# Patient Record
Sex: FEMALE | Race: WHITE | NOT HISPANIC OR LATINO | Employment: FULL TIME | URBAN - NONMETROPOLITAN AREA
[De-identification: names, ages, dates, MRNs, and addresses within clinical notes are randomized per-mention and may not be internally consistent; named-entity substitution may affect disease eponyms.]

---

## 2017-01-13 VITALS
WEIGHT: 168 LBS | BODY MASS INDEX: 28.68 KG/M2 | HEIGHT: 64 IN | SYSTOLIC BLOOD PRESSURE: 116 MMHG | DIASTOLIC BLOOD PRESSURE: 70 MMHG

## 2017-01-13 RX ORDER — LISINOPRIL 10 MG/1
10 TABLET ORAL DAILY
COMMUNITY

## 2017-01-18 ENCOUNTER — OFFICE VISIT (OUTPATIENT)
Dept: OBSTETRICS AND GYNECOLOGY | Facility: CLINIC | Age: 37
End: 2017-01-18

## 2017-01-18 VITALS
WEIGHT: 168 LBS | BODY MASS INDEX: 28.68 KG/M2 | DIASTOLIC BLOOD PRESSURE: 62 MMHG | HEIGHT: 64 IN | SYSTOLIC BLOOD PRESSURE: 126 MMHG

## 2017-01-18 DIAGNOSIS — Z12.4 SCREENING FOR MALIGNANT NEOPLASM OF CERVIX: ICD-10-CM

## 2017-01-18 DIAGNOSIS — Z01.419 ENCOUNTER FOR GYNECOLOGICAL EXAMINATION WITHOUT ABNORMAL FINDING: Primary | ICD-10-CM

## 2017-01-18 PROCEDURE — 99395 PREV VISIT EST AGE 18-39: CPT | Performed by: PHYSICIAN ASSISTANT

## 2017-01-18 NOTE — MR AVS SNAPSHOT
Paty Aaron   2017 2:20 PM   Office Visit    Dept Phone:  [phone]   Encounter #:  [id number]    Provider:  Lorna Longoria PA-C   Department:  [facility]                Your Full Care Plan              Your Updated Medication List          This list is accurate as of: 17  3:00 PM.  Always use your most recent med list.                lisinopril 10 MG tablet   Commonly known as:  PRINIVIL,ZESTRIL       sertraline 50 MG tablet   Commonly known as:  ZOLOFT               We Performed the Following     Liquid-based Pap Smear, Screening       You Were Diagnosed With        Codes Comments    Encounter for gynecological examination without abnormal finding    -  Primary ICD-10-CM: Z01.419  ICD-9-CM: V72.31     Screening for malignant neoplasm of cervix     ICD-10-CM: Z12.4  ICD-9-CM: V76.2       Instructions    SBE monthly  Regular excercise     Patient Instructions History      Upcoming Appointments     Visit Type Date Time Department    ANNUAL 2017  2:20 PM [facility]      [employer] Signup     [employer] allows you to send messages to your doctor, view your test results, renew your prescriptions, schedule appointments, and more. To sign up, go to [employer] and click on the Sign Up Now link in the New User? box. Enter your [employer] Activation Code exactly as it appears below along with the last four digits of your Social Security Number and your Date of Birth () to complete the sign-up process. If you do not sign up before the expiration date, you must request a new code.    [employer] Activation Code: [email or web address]  Expires: 2017  3:00 PM    If you have questions, you can email [email or web address] or call [phone] to talk to our [employer] staff. Remember, [employer] is NOT to be used for urgent needs. For medical emergencies, dial 911.               Other Info from Your Visit         "       Allergies     Penicillins        Reason for Visit     Gynecologic Exam           Vital Signs     Blood Pressure Height Weight Breastfeeding? Body Mass Index Smoking Status    126/62 64\" (162.6 cm) 168 lb (76.2 kg) No 28.84 kg/m2 Current Every Day Smoker      Problems and Diagnoses Noted     Encounter for routine gynecological examination    -  Primary    Screening for cervical cancer            "

## 2017-01-18 NOTE — PROGRESS NOTES
"Subjective   Chief Complaint   Patient presents with   • Gynecologic Exam     Paty Aaron is a 36 y.o. year old  presenting to be seen for her annual exam.   Patient has no complaints today  Menses pretty regular q 28-35 days with 5 day flow  Condoms for birth control    History reviewed. No pertinent past medical history.     Current Outpatient Prescriptions:   •  lisinopril (PRINIVIL,ZESTRIL) 10 MG tablet, Take 10 mg by mouth Daily., Disp: , Rfl:   •  sertraline (ZOLOFT) 50 MG tablet, Take 50 mg by mouth Daily., Disp: , Rfl:    Allergies   Allergen Reactions   • Penicillins       Past Surgical History   Procedure Laterality Date   • No past surgeries     • Vaginal delivery  ,       Social History     Social History   • Marital status:      Spouse name: N/A   • Number of children: N/A   • Years of education: N/A     Occupational History   • Not on file.     Social History Main Topics   • Smoking status: Current Every Day Smoker   • Smokeless tobacco: Never Used   • Alcohol use No   • Drug use: No   • Sexual activity: Yes     Birth control/ protection: Condom     Other Topics Concern   • Not on file     Social History Narrative      Family History   Problem Relation Age of Onset   • Hypertension Father        The following portions of the patient's history were reviewed and updated as appropriate:problem list, current medications, allergies, past family history, past medical history, past social history and past surgical history.    Review of Systems   Constitutional: Negative.    Gastrointestinal: Negative.    Genitourinary: Negative.            Objective   Visit Vitals   • /62   • Ht 64\" (162.6 cm)   • Wt 168 lb (76.2 kg)   • Breastfeeding No   • BMI 28.84 kg/m2       Physical Exam   Constitutional: Vital signs are normal. She appears well-developed and well-nourished.   Neck: No thyroid mass and no thyromegaly present.   Cardiovascular: Normal rate, regular rhythm and normal heart " sounds.    Pulmonary/Chest: Effort normal and breath sounds normal. Right breast exhibits no inverted nipple, no mass, no nipple discharge, no skin change and no tenderness. Left breast exhibits no inverted nipple, no mass, no nipple discharge, no skin change and no tenderness. Breasts are symmetrical.   Abdominal: Soft. Normal appearance. She exhibits no distension. There is no hepatosplenomegaly. There is no tenderness.   Genitourinary: Vagina normal and uterus normal. There is lesion on the right labia. There is no rash or tenderness on the right labia. There is lesion on the left labia. There is no rash or tenderness on the left labia. Cervix exhibits no motion tenderness, no discharge and no friability. Right adnexum displays no mass and no tenderness. Left adnexum displays no mass and no tenderness.   Genitourinary Comments: Sebaceous cysts bilateral labia minora   Skin: Skin is warm, dry and intact.   Psychiatric: She has a normal mood and affect. Her behavior is normal.            Assessment /Plan      Paty was seen today for gynecologic exam.    Diagnoses and all orders for this visit:    Encounter for gynecological examination without abnormal finding    Screening for malignant neoplasm of cervix  -     Liquid-based Pap Smear, Screening               This note was electronically signed.    Lorna Longoria PA-C   January 18, 2017

## 2018-09-12 ENCOUNTER — OFFICE VISIT (OUTPATIENT)
Dept: OBSTETRICS AND GYNECOLOGY | Facility: CLINIC | Age: 38
End: 2018-09-12

## 2018-09-12 VITALS
BODY MASS INDEX: 28.27 KG/M2 | WEIGHT: 165.6 LBS | SYSTOLIC BLOOD PRESSURE: 124 MMHG | DIASTOLIC BLOOD PRESSURE: 80 MMHG | HEIGHT: 64 IN

## 2018-09-12 DIAGNOSIS — Z12.4 SCREENING FOR MALIGNANT NEOPLASM OF CERVIX: ICD-10-CM

## 2018-09-12 DIAGNOSIS — Z12.39 SCREENING FOR BREAST CANCER: ICD-10-CM

## 2018-09-12 DIAGNOSIS — Z01.419 ENCOUNTER FOR GYNECOLOGICAL EXAMINATION WITHOUT ABNORMAL FINDING: Primary | ICD-10-CM

## 2018-09-12 PROCEDURE — 99396 PREV VISIT EST AGE 40-64: CPT | Performed by: PHYSICIAN ASSISTANT

## 2018-09-12 RX ORDER — VALACYCLOVIR HYDROCHLORIDE 1 G/1
TABLET, FILM COATED ORAL
Refills: 2 | COMMUNITY
Start: 2018-06-29

## 2018-09-12 RX ORDER — ERGOCALCIFEROL 1.25 MG/1
CAPSULE ORAL
Refills: 5 | COMMUNITY
Start: 2018-08-30

## 2018-09-12 RX ORDER — CYANOCOBALAMIN 1000 UG/ML
INJECTION, SOLUTION INTRAMUSCULAR; SUBCUTANEOUS
Refills: 5 | COMMUNITY
Start: 2018-08-30 | End: 2022-10-07

## 2018-09-12 NOTE — PROGRESS NOTES
Subjective   Chief Complaint   Patient presents with   • Gynecologic Exam     Last pap done 17-WNL; MMG is due; no complaints       Paty Aaron is a 38 y.o. year old  presenting to be seen for her annual gynecological exam.   She has no complaints or concerns  . Using condoms for birth control  Cycles regular q month  Patient's last menstrual period was 2018 (exact date).      History reviewed. No pertinent past medical history.     Current Outpatient Prescriptions:   •  cyanocobalamin 1000 MCG/ML injection, INJECT 1 ML UNDER THE SKIN ONCE A MONTH FOR VITAMIN B12, Disp: , Rfl: 5  •  lisinopril (PRINIVIL,ZESTRIL) 10 MG tablet, Take 10 mg by mouth Daily., Disp: , Rfl:   •  sertraline (ZOLOFT) 50 MG tablet, Take 50 mg by mouth Daily., Disp: , Rfl:   •  valACYclovir (VALTREX) 1000 MG tablet, TAKE TWO TABLETS BY MOUTH EVERY 12 HOURS FOR 1 DAY FOR FEVER BLISTER, Disp: , Rfl: 2  •  vitamin D (ERGOCALCIFEROL) 75274 units capsule capsule, TAKE 1 CAPSULE BY MOUTH ONCE A WEEK FOR VITAMIN D DEFICIENCY, Disp: , Rfl: 5   Allergies   Allergen Reactions   • Penicillins Rash      Past Surgical History:   Procedure Laterality Date   • NO PAST SURGERIES     • VAGINAL DELIVERY  ,       Social History     Social History   • Marital status:      Spouse name: N/A   • Number of children: N/A   • Years of education: N/A     Occupational History   • Not on file.     Social History Main Topics   • Smoking status: Current Every Day Smoker     Packs/day: 1.00     Types: Cigarettes   • Smokeless tobacco: Never Used   • Alcohol use No   • Drug use: No   • Sexual activity: Yes     Partners: Male     Birth control/ protection: Condom     Other Topics Concern   • Not on file     Social History Narrative   • No narrative on file      Family History   Problem Relation Age of Onset   • Hypertension Father        Review of Systems   Constitutional: Negative.    Gastrointestinal: Negative.    Genitourinary:  "Negative.            Objective   /80   Ht 162.6 cm (64\")   Wt 75.1 kg (165 lb 9.6 oz)   LMP 09/05/2018 (Exact Date)   Breastfeeding? No   BMI 28.43 kg/m²     Physical Exam   Constitutional: She appears well-developed and well-nourished. She is cooperative.   Pulmonary/Chest: Right breast exhibits no inverted nipple, no mass, no nipple discharge, no skin change and no tenderness. Left breast exhibits no inverted nipple, no mass, no nipple discharge, no skin change and no tenderness.   Abdominal: Soft. Normal appearance. There is no tenderness.   Genitourinary: Vagina normal and uterus normal. There is no tenderness or lesion on the right labia. There is no tenderness or lesion on the left labia. Cervix exhibits no motion tenderness and no discharge. Right adnexum displays no mass and no tenderness. Left adnexum displays no mass and no tenderness.   Neurological: She is alert.   Skin: Skin is warm and dry.   Psychiatric: She has a normal mood and affect. Her behavior is normal.            Assessment and Plan  Paty was seen today for gynecologic exam.    Diagnoses and all orders for this visit:    Encounter for gynecological examination without abnormal finding    Screening for malignant neoplasm of cervix  -     Liquid-based Pap Smear, Screening; Future    Screening for breast cancer  -     Mammo Screening Digital Tomosynthesis Bilateral With CAD; Future      Patient Instructions   Recommend self breast exam monthly  Screening mammogram now. Order placed             This note was electronically signed.    Lorna Longoria PA-C   September 12, 2018  "

## 2018-09-18 DIAGNOSIS — Z12.4 SCREENING FOR MALIGNANT NEOPLASM OF CERVIX: ICD-10-CM

## 2018-10-11 ENCOUNTER — APPOINTMENT (OUTPATIENT)
Dept: MAMMOGRAPHY | Facility: HOSPITAL | Age: 38
End: 2018-10-11

## 2018-12-04 ENCOUNTER — APPOINTMENT (OUTPATIENT)
Dept: MAMMOGRAPHY | Facility: HOSPITAL | Age: 38
End: 2018-12-04

## 2020-09-02 ENCOUNTER — OFFICE VISIT (OUTPATIENT)
Dept: OBSTETRICS AND GYNECOLOGY | Facility: CLINIC | Age: 40
End: 2020-09-02

## 2020-09-02 VITALS
BODY MASS INDEX: 28.75 KG/M2 | WEIGHT: 168.4 LBS | HEIGHT: 64 IN | SYSTOLIC BLOOD PRESSURE: 130 MMHG | DIASTOLIC BLOOD PRESSURE: 80 MMHG

## 2020-09-02 DIAGNOSIS — N92.3 INTERMENSTRUAL BLEEDING: ICD-10-CM

## 2020-09-02 DIAGNOSIS — Z01.419 ENCOUNTER FOR GYNECOLOGICAL EXAMINATION WITHOUT ABNORMAL FINDING: Primary | ICD-10-CM

## 2020-09-02 DIAGNOSIS — Z12.39 SCREENING FOR BREAST CANCER: ICD-10-CM

## 2020-09-02 DIAGNOSIS — Z12.4 SCREENING FOR CERVICAL CANCER: ICD-10-CM

## 2020-09-02 PROCEDURE — 99396 PREV VISIT EST AGE 40-64: CPT | Performed by: PHYSICIAN ASSISTANT

## 2020-09-02 NOTE — PROGRESS NOTES
Subjective   Chief Complaint   Patient presents with   • Gynecologic Exam     Last pap done 18-WNL, MMG is due   • Menstrual Problem     C/O spotting in between periods       Paty Aaron is a 40 y.o. year old  presenting to be seen for her annual gynecological exam.   She has never had a screening mammogram.  Ports that she has been having a regular monthly 5-day bleed until this past month.  She reports having a normal period 2 weeks ago which lasted for 5 days.  She stopped bleeding for 1 week and then she began spotting again 3-4 days ago but this is only noted when she wipes.  She has not  had to use a tampon or pad.  She has had some cramping with the spotting but no significant pain.  She reports that she had normal labs at her PCP approximately 3 to 6 months ago including thyroid screening.  Condoms for birth control  History reviewed. No pertinent past medical history.     Current Outpatient Medications:   •  cyanocobalamin 1000 MCG/ML injection, INJECT 1 ML UNDER THE SKIN ONCE A MONTH FOR VITAMIN B12, Disp: , Rfl: 5  •  lisinopril (PRINIVIL,ZESTRIL) 10 MG tablet, Take 10 mg by mouth Daily., Disp: , Rfl:   •  sertraline (ZOLOFT) 50 MG tablet, Take 50 mg by mouth Daily., Disp: , Rfl:   •  valACYclovir (VALTREX) 1000 MG tablet, TAKE TWO TABLETS BY MOUTH EVERY 12 HOURS FOR 1 DAY FOR FEVER BLISTER, Disp: , Rfl: 2  •  vitamin D (ERGOCALCIFEROL) 84699 units capsule capsule, TAKE 1 CAPSULE BY MOUTH ONCE A WEEK FOR VITAMIN D DEFICIENCY, Disp: , Rfl: 5   Allergies   Allergen Reactions   • Penicillins Rash      Past Surgical History:   Procedure Laterality Date   • NO PAST SURGERIES     • VAGINAL DELIVERY  ,       Social History     Socioeconomic History   • Marital status:      Spouse name: Not on file   • Number of children: Not on file   • Years of education: Not on file   • Highest education level: Not on file   Tobacco Use   • Smoking status: Current Every Day Smoker     Packs/day:  "1.00     Types: Cigarettes   • Smokeless tobacco: Never Used   Substance and Sexual Activity   • Alcohol use: No   • Drug use: No   • Sexual activity: Yes     Partners: Male     Birth control/protection: Condom      Family History   Problem Relation Age of Onset   • Hypertension Father        Review of Systems   Constitutional: Negative for chills, diaphoresis and fever.   Gastrointestinal: Negative for constipation, diarrhea, nausea and vomiting.   Genitourinary: Positive for vaginal bleeding. Negative for difficulty urinating, dysuria and vaginal discharge.   Musculoskeletal: Negative.    All other systems reviewed and are negative.          Objective   /80   Ht 162.6 cm (64\")   Wt 76.4 kg (168 lb 6.4 oz)   LMP 08/19/2020   Breastfeeding No   BMI 28.91 kg/m²     Physical Exam   Constitutional: She appears well-developed and well-nourished. She is cooperative. No distress.   Eyes: Conjunctivae, EOM and lids are normal.   Pulmonary/Chest: Right breast exhibits no inverted nipple, no mass, no nipple discharge, no skin change and no tenderness. Left breast exhibits no inverted nipple, no mass, no nipple discharge, no skin change and no tenderness.   Abdominal: Soft. Normal appearance. There is no tenderness. There is no rigidity and no guarding.   Genitourinary: Uterus normal. There is no tenderness or lesion on the right labia. There is no tenderness or lesion on the left labia. Cervix exhibits no motion tenderness. Right adnexum displays no mass and no tenderness. Left adnexum displays no mass and no tenderness. There is bleeding in the vagina. No tenderness in the vagina.   Genitourinary Comments: Pap done after cleaning cervix  Light amount dark red blood observed     Musculoskeletal: Normal range of motion.   Neurological: She is alert.   Skin: Skin is warm and dry. No lesion and no rash noted.   Psychiatric: She has a normal mood and affect. Her behavior is normal. Thought content normal.          "   Assessment and Plan  Paty was seen today for gynecologic exam and menstrual problem.    Diagnoses and all orders for this visit:    Encounter for gynecological examination without abnormal finding    Screening for cervical cancer  -     Liquid-based Pap Smear, Screening; Future    Screening for breast cancer  -     Mammo Screening Digital Tomosynthesis Bilateral With CAD; Future    Intermenstrual bleeding      Patient Instructions   Patient is encouraged to do his monthly self breast exam.  Screening mammogram is ordered for now.  Courage regular exercise.  As this is the first episode of bleeding between menses patient has had and she has normal exam we will await Pap smear to return.  Patient is advised should she had further intermenstrual bleeding or abnormal bleeding or any concerns to return to the office and pelvic ultrasound will be obtained at that time.             This note was electronically signed.    Lorna Longoria PA-C   September 2, 2020

## 2020-09-16 DIAGNOSIS — Z12.4 SCREENING FOR CERVICAL CANCER: ICD-10-CM

## 2020-11-19 ENCOUNTER — HOSPITAL ENCOUNTER (OUTPATIENT)
Dept: MAMMOGRAPHY | Facility: HOSPITAL | Age: 40
Discharge: HOME | End: 2020-11-19
Admitting: PHYSICIAN ASSISTANT

## 2020-11-19 DIAGNOSIS — Z12.39 SCREENING FOR BREAST CANCER: ICD-10-CM

## 2020-11-19 PROCEDURE — 77063 BREAST TOMOSYNTHESIS BI: CPT

## 2020-11-19 PROCEDURE — 77067 SCR MAMMO BI INCL CAD: CPT

## 2021-03-23 ENCOUNTER — BULK ORDERING (OUTPATIENT)
Dept: CASE MANAGEMENT | Facility: OTHER | Age: 41
End: 2021-03-23

## 2021-03-23 DIAGNOSIS — Z23 IMMUNIZATION DUE: ICD-10-CM

## 2022-08-02 ENCOUNTER — OFFICE VISIT (OUTPATIENT)
Dept: OBSTETRICS AND GYNECOLOGY | Facility: CLINIC | Age: 42
End: 2022-08-02

## 2022-08-02 VITALS
WEIGHT: 163.2 LBS | SYSTOLIC BLOOD PRESSURE: 130 MMHG | HEIGHT: 64 IN | DIASTOLIC BLOOD PRESSURE: 82 MMHG | BODY MASS INDEX: 27.86 KG/M2

## 2022-08-02 DIAGNOSIS — R39.9 URINARY TRACT INFECTION SYMPTOMS: ICD-10-CM

## 2022-08-02 DIAGNOSIS — N92.0 MENORRHAGIA WITH REGULAR CYCLE: Primary | ICD-10-CM

## 2022-08-02 DIAGNOSIS — N94.6 DYSMENORRHEA: ICD-10-CM

## 2022-08-02 LAB
BILIRUB BLD-MCNC: ABNORMAL MG/DL
CLARITY, POC: CLEAR
COLOR UR: YELLOW
EXPIRATION DATE: ABNORMAL
GLUCOSE UR STRIP-MCNC: ABNORMAL MG/DL
KETONES UR QL: ABNORMAL
LEUKOCYTE EST, POC: ABNORMAL
Lab: ABNORMAL
NITRITE UR-MCNC: NEGATIVE MG/ML
PH UR: 7 [PH] (ref 5–8)
PROT UR STRIP-MCNC: ABNORMAL MG/DL
RBC # UR STRIP: ABNORMAL /UL
SP GR UR: 1.01 (ref 1–1.03)
UROBILINOGEN UR QL: NORMAL

## 2022-08-02 PROCEDURE — 99213 OFFICE O/P EST LOW 20 MIN: CPT | Performed by: PHYSICIAN ASSISTANT

## 2022-08-02 RX ORDER — CIPROFLOXACIN 500 MG/1
500 TABLET, FILM COATED ORAL 2 TIMES DAILY
COMMUNITY
End: 2022-10-07

## 2022-08-02 NOTE — PATIENT INSTRUCTIONS
Patient reassured pelvic exam normal  Will RTO for pelvic ultrasound however to better assess uterus and ovaries

## 2022-08-02 NOTE — PROGRESS NOTES
Subjective   Chief Complaint   Patient presents with   • Menstrual Problem     C/O heavy periods with clotting and cramping, currently taking Cipro for a UTI       Paty Aaron is a 42 y.o. year old  presenting to be seen for heavy menses.   Has noted change in menses over the last 2 months.  She is having a regular bleed every 28 days and bleeds typically have been for 3 to 4 days.  However the last 2 cycles she has bled for 7 to 8 days with heavier bleeding and cramps.  LMP was 2022  She is recently  1 month ago and is using coitus interruptus for birth control  She is currently on day 4 Cipro for UTI which is treated by her PCP. Took AZO yesterday also  Normal Pap 2020      Past Medical History:   Diagnosis Date   • Urinary tract infection         Current Outpatient Medications:   •  ciprofloxacin (CIPRO) 500 MG tablet, Take 500 mg by mouth 2 (Two) Times a Day., Disp: , Rfl:   •  cyanocobalamin 1000 MCG/ML injection, INJECT 1 ML UNDER THE SKIN ONCE A MONTH FOR VITAMIN B12, Disp: , Rfl: 5  •  lisinopril (PRINIVIL,ZESTRIL) 10 MG tablet, Take 10 mg by mouth Daily., Disp: , Rfl:   •  sertraline (ZOLOFT) 50 MG tablet, Take 50 mg by mouth Daily., Disp: , Rfl:   •  valACYclovir (VALTREX) 1000 MG tablet, TAKE TWO TABLETS BY MOUTH EVERY 12 HOURS FOR 1 DAY FOR FEVER BLISTER, Disp: , Rfl: 2  •  vitamin D (ERGOCALCIFEROL) 98964 units capsule capsule, TAKE 1 CAPSULE BY MOUTH ONCE A WEEK FOR VITAMIN D DEFICIENCY, Disp: , Rfl: 5   Allergies   Allergen Reactions   • Penicillins Rash      Past Surgical History:   Procedure Laterality Date   • NO PAST SURGERIES     • VAGINAL DELIVERY  ,       Social History     Socioeconomic History   • Marital status:    Tobacco Use   • Smoking status: Current Every Day Smoker     Packs/day: 1.00     Types: Cigarettes   • Smokeless tobacco: Never Used   Vaping Use   • Vaping Use: Never used   Substance and Sexual Activity   • Alcohol use: No   • Drug  "use: No   • Sexual activity: Yes     Partners: Male     Birth control/protection: Condom      Family History   Problem Relation Age of Onset   • Hypertension Father        Review of Systems   Constitutional: Negative for chills, diaphoresis and fever.   Gastrointestinal: Negative for constipation, diarrhea, nausea and vomiting.   Genitourinary: Positive for dysuria, menstrual problem and pelvic pain. Negative for difficulty urinating.           Objective   /82   Ht 162.6 cm (64\")   Wt 74 kg (163 lb 3.2 oz)   LMP 07/20/2022 (Exact Date)   Breastfeeding No   BMI 28.01 kg/m²     Physical Exam  Constitutional:       Appearance: Normal appearance. She is well-developed and well-groomed.   Eyes:      General: Lids are normal.      Extraocular Movements: Extraocular movements intact.      Conjunctiva/sclera: Conjunctivae normal.   Genitourinary:     Labia:         Right: No rash, tenderness or lesion.         Left: No rash, tenderness or lesion.       Urethra: No prolapse, urethral pain, urethral swelling or urethral lesion.      Vagina: No vaginal discharge, tenderness, bleeding or lesions.      Cervix: No cervical motion tenderness, discharge, friability or lesion.      Uterus: Not enlarged and not tender.       Adnexa:         Right: No mass or tenderness.          Left: No mass or tenderness.     Skin:     General: Skin is warm and dry.      Findings: No bruising or lesion.   Neurological:      Mental Status: She is alert.   Psychiatric:         Attention and Perception: Attention normal.         Mood and Affect: Mood normal.         Speech: Speech normal.         Behavior: Behavior is cooperative.            Result Review :                   Assessment and Plan  Diagnoses and all orders for this visit:    1. Menorrhagia with regular cycle (Primary)  -     US Non-ob Transvaginal    2. Dysmenorrhea  -     US Non-ob Transvaginal    3. Urinary tract infection symptoms  -     POC Urinalysis Dipstick, " Automated      Patient Instructions   Patient reassured pelvic exam normal  Will RTO for pelvic ultrasound however to better assess uterus and ovaries              This note was electronically signed.    Lorna Longoria PA-C   August 2, 2022

## 2022-10-07 ENCOUNTER — OFFICE VISIT (OUTPATIENT)
Dept: OBSTETRICS AND GYNECOLOGY | Facility: CLINIC | Age: 42
End: 2022-10-07

## 2022-10-07 VITALS
BODY MASS INDEX: 28.85 KG/M2 | SYSTOLIC BLOOD PRESSURE: 126 MMHG | DIASTOLIC BLOOD PRESSURE: 78 MMHG | HEIGHT: 64 IN | WEIGHT: 169 LBS

## 2022-10-07 DIAGNOSIS — Z01.419 WELL WOMAN EXAM WITH ROUTINE GYNECOLOGICAL EXAM: Primary | ICD-10-CM

## 2022-10-07 DIAGNOSIS — N92.0 MENORRHAGIA WITH REGULAR CYCLE: ICD-10-CM

## 2022-10-07 DIAGNOSIS — Z12.4 SCREENING FOR CERVICAL CANCER: ICD-10-CM

## 2022-10-07 DIAGNOSIS — N92.3 INTERMENSTRUAL BLEEDING: ICD-10-CM

## 2022-10-07 DIAGNOSIS — R39.9 UTI SYMPTOMS: ICD-10-CM

## 2022-10-07 DIAGNOSIS — Z12.31 ENCOUNTER FOR SCREENING MAMMOGRAM FOR MALIGNANT NEOPLASM OF BREAST: ICD-10-CM

## 2022-10-07 LAB
BILIRUB BLD-MCNC: NEGATIVE MG/DL
CLARITY, POC: CLEAR
COLOR UR: YELLOW
EXPIRATION DATE: ABNORMAL
GLUCOSE UR STRIP-MCNC: NEGATIVE MG/DL
KETONES UR QL: NEGATIVE
LEUKOCYTE EST, POC: NEGATIVE
Lab: ABNORMAL
NITRITE UR-MCNC: NEGATIVE MG/ML
PH UR: 7.5 [PH] (ref 5–8)
PROT UR STRIP-MCNC: NEGATIVE MG/DL
RBC # UR STRIP: ABNORMAL /UL
SP GR UR: 1.01 (ref 1–1.03)
UROBILINOGEN UR QL: NORMAL

## 2022-10-07 PROCEDURE — 99396 PREV VISIT EST AGE 40-64: CPT | Performed by: PHYSICIAN ASSISTANT

## 2022-10-07 NOTE — PROGRESS NOTES
Subjective   Chief Complaint   Patient presents with   • Gynecologic Exam     Last pap done 20, MMG is due, C/O bleeding between periods       Paty Aaron is a 42 y.o. year old  presenting to be seen for her annual gynecological exam.   Patient would like to schedule a screening mammogram.  Her LMP was 2022 and reports that she has bled almost daily since the onset of last menses.  She was seen in early August with heavy menses that had been going on for about 2 months.  She reported her cycles had been regular however they had become heavier with more clots over the preceding 2 months.  She was scheduled to return for pelvic ultrasound a little later in August however she canceled that appointment.  She is using coitus interruptus for birth control    Past Medical History:   Diagnosis Date   • Depression    • Hypertension    • Urinary tract infection         Current Outpatient Medications:   •  lisinopril (PRINIVIL,ZESTRIL) 10 MG tablet, Take 10 mg by mouth Daily., Disp: , Rfl:   •  sertraline (ZOLOFT) 50 MG tablet, Take 50 mg by mouth Daily., Disp: , Rfl:   •  valACYclovir (VALTREX) 1000 MG tablet, TAKE TWO TABLETS BY MOUTH EVERY 12 HOURS FOR 1 DAY FOR FEVER BLISTER, Disp: , Rfl: 2  •  vitamin D (ERGOCALCIFEROL) 87146 units capsule capsule, TAKE 1 CAPSULE BY MOUTH ONCE A WEEK FOR VITAMIN D DEFICIENCY, Disp: , Rfl: 5   Allergies   Allergen Reactions   • Penicillins Rash      Past Surgical History:   Procedure Laterality Date   • NO PAST SURGERIES     • VAGINAL DELIVERY  ,       Social History     Socioeconomic History   • Marital status:    Tobacco Use   • Smoking status: Current Every Day Smoker     Packs/day: 1.00     Years: 15.00     Pack years: 15.00     Types: Cigarettes   • Smokeless tobacco: Never Used   Vaping Use   • Vaping Use: Never used   Substance and Sexual Activity   • Alcohol use: No   • Drug use: No   • Sexual activity: Yes     Partners: Male     Birth  "control/protection: None     Comment: We just use the pull out method      Family History   Problem Relation Age of Onset   • Hypertension Father        Review of Systems   Constitutional: Negative for chills, diaphoresis and fever.   Gastrointestinal: Negative.    Genitourinary: Positive for menstrual problem and vaginal bleeding. Negative for difficulty urinating, dysuria and pelvic pain.           Objective   /78   Ht 162.6 cm (64\")   Wt 76.7 kg (169 lb)   LMP 09/09/2022   Breastfeeding No   BMI 29.01 kg/m²     Physical Exam  Constitutional:       Appearance: Normal appearance. She is well-developed and well-groomed.   Eyes:      General: Lids are normal.      Extraocular Movements: Extraocular movements intact.      Conjunctiva/sclera: Conjunctivae normal.   Chest:   Breasts: Breasts are symmetrical.      Right: No inverted nipple, mass, nipple discharge, skin change, tenderness or axillary adenopathy.      Left: No inverted nipple, mass, nipple discharge, skin change, tenderness or axillary adenopathy.       Abdominal:      Palpations: Abdomen is soft.      Tenderness: There is no abdominal tenderness.   Genitourinary:     Labia:         Right: No rash, tenderness or lesion.         Left: No rash, tenderness or lesion.       Urethra: No prolapse, urethral pain, urethral swelling or urethral lesion.      Vagina: Bleeding present. No tenderness or lesions.      Cervix: Cervical bleeding present. No cervical motion tenderness or lesion.      Uterus: Not enlarged and not tender.       Adnexa:         Right: No mass or tenderness.          Left: No mass or tenderness.        Rectum: No external hemorrhoid.   Lymphadenopathy:      Upper Body:      Right upper body: No axillary adenopathy.      Left upper body: No axillary adenopathy.   Skin:     General: Skin is warm and dry.      Findings: No bruising or lesion.   Neurological:      General: No focal deficit present.      Mental Status: She is alert and " oriented to person, place, and time.   Psychiatric:         Attention and Perception: Attention normal.         Mood and Affect: Mood normal.         Speech: Speech normal.         Behavior: Behavior is cooperative.            Result Review :                   Assessment and Plan  Diagnoses and all orders for this visit:    1. Well woman exam with routine gynecological exam (Primary)    2. Screening for cervical cancer  -     LIQUID-BASED PAP SMEAR, [facility] ([facility])    3. Menorrhagia with regular cycle  -     HCG, Quantitative, Pregnancy (Hospital Lab Only); Future  -     CBC & Differential; Future  -     Estradiol; Future  -     Follicle Stimulating Hormone; Future  -     TSH Rfx On Abnormal To Free T4; Future    4. Intermenstrual bleeding  -     HCG, Quantitative, Pregnancy (Hospital Lab Only); Future  -     CBC & Differential; Future  -     Estradiol; Future  -     Follicle Stimulating Hormone; Future  -     TSH Rfx On Abnormal To Free T4; Future    5. UTI symptoms  -     POC Urinalysis Dipstick, Automated    6. Encounter for screening mammogram for malignant neoplasm of breast  -     Mammo Screening Digital Tomosynthesis Bilateral With CAD; Future      Patient Instructions   Self breast exam monthly  Annual screening mammogram  Regular exercise    RTO next week for pelvic ultrasound  Labs when RTO for pelvic ultrasound             This note was electronically signed.    Lorna Longoria PA-C   October 7, 2022

## 2022-10-07 NOTE — PATIENT INSTRUCTIONS
Self breast exam monthly  Annual screening mammogram  Regular exercise    RTO next week for pelvic ultrasound  Labs when RTO for pelvic ultrasound

## 2022-10-12 LAB — REF LAB TEST METHOD: NORMAL

## 2022-10-13 ENCOUNTER — LAB (OUTPATIENT)
Dept: LAB | Facility: HOSPITAL | Age: 42
End: 2022-10-13

## 2022-10-13 DIAGNOSIS — N92.0 MENORRHAGIA WITH REGULAR CYCLE: ICD-10-CM

## 2022-10-13 DIAGNOSIS — N92.3 INTERMENSTRUAL BLEEDING: ICD-10-CM

## 2022-10-13 PROCEDURE — 85025 COMPLETE CBC W/AUTO DIFF WBC: CPT

## 2022-10-13 PROCEDURE — 82670 ASSAY OF TOTAL ESTRADIOL: CPT

## 2022-10-13 PROCEDURE — 83001 ASSAY OF GONADOTROPIN (FSH): CPT

## 2022-10-13 PROCEDURE — 84702 CHORIONIC GONADOTROPIN TEST: CPT

## 2022-10-13 PROCEDURE — 36415 COLL VENOUS BLD VENIPUNCTURE: CPT

## 2022-10-13 PROCEDURE — 85007 BL SMEAR W/DIFF WBC COUNT: CPT

## 2022-10-13 PROCEDURE — 84443 ASSAY THYROID STIM HORMONE: CPT

## 2022-10-14 LAB
BASOPHILS # BLD AUTO: 0.08 10*3/MM3 (ref 0–0.2)
BASOPHILS NFR BLD AUTO: 0.8 % (ref 0–1.5)
DEPRECATED RDW RBC AUTO: 35.9 FL (ref 37–54)
EOSINOPHIL # BLD AUTO: 0.15 10*3/MM3 (ref 0–0.4)
EOSINOPHIL # BLD MANUAL: 0.21 10*3/MM3 (ref 0–0.4)
EOSINOPHIL NFR BLD AUTO: 1.5 % (ref 0.3–6.2)
EOSINOPHIL NFR BLD MANUAL: 2.1 % (ref 0.3–6.2)
ERYTHROCYTE [DISTWIDTH] IN BLOOD BY AUTOMATED COUNT: 11.7 % (ref 12.3–15.4)
ESTRADIOL SERPL HS-MCNC: 123 PG/ML
FSH SERPL-ACNC: 6.51 MIU/ML
HCG INTACT+B SERPL-ACNC: <1 MIU/ML
HCT VFR BLD AUTO: 39.6 % (ref 34–46.6)
HGB BLD-MCNC: 13.6 G/DL (ref 12–15.9)
LYMPHOCYTES # BLD AUTO: 4.59 10*3/MM3 (ref 0.7–3.1)
LYMPHOCYTES # BLD MANUAL: 4 10*3/MM3 (ref 0.7–3.1)
LYMPHOCYTES NFR BLD AUTO: 45 % (ref 19.6–45.3)
LYMPHOCYTES NFR BLD MANUAL: 9.3 % (ref 5–12)
MCH RBC QN AUTO: 29.5 PG (ref 26.6–33)
MCHC RBC AUTO-ENTMCNC: 34.3 G/DL (ref 31.5–35.7)
MCV RBC AUTO: 85.9 FL (ref 79–97)
MONOCYTES # BLD AUTO: 0.62 10*3/MM3 (ref 0.1–0.9)
MONOCYTES # BLD: 0.95 10*3/MM3 (ref 0.1–0.9)
MONOCYTES NFR BLD AUTO: 6.1 % (ref 5–12)
NEUTROPHILS # BLD AUTO: 5.05 10*3/MM3 (ref 1.7–7)
NEUTROPHILS NFR BLD AUTO: 4.75 10*3/MM3 (ref 1.7–7)
NEUTROPHILS NFR BLD AUTO: 46.4 % (ref 42.7–76)
NEUTROPHILS NFR BLD MANUAL: 49.5 % (ref 42.7–76)
PLAT MORPH BLD: NORMAL
PLATELET # BLD AUTO: 317 10*3/MM3 (ref 140–450)
PMV BLD AUTO: 11.1 FL (ref 6–12)
POIKILOCYTOSIS BLD QL SMEAR: ABNORMAL
RBC # BLD AUTO: 4.61 10*6/MM3 (ref 3.77–5.28)
TSH SERPL DL<=0.05 MIU/L-ACNC: 1.76 UIU/ML (ref 0.27–4.2)
VARIANT LYMPHS NFR BLD MANUAL: 39.2 % (ref 19.6–45.3)
WBC MORPH BLD: NORMAL
WBC NRBC COR # BLD: 10.21 10*3/MM3 (ref 3.4–10.8)